# Patient Record
Sex: FEMALE | Race: WHITE | NOT HISPANIC OR LATINO | Employment: PART TIME | URBAN - METROPOLITAN AREA
[De-identification: names, ages, dates, MRNs, and addresses within clinical notes are randomized per-mention and may not be internally consistent; named-entity substitution may affect disease eponyms.]

---

## 2024-01-23 DIAGNOSIS — R51.9 FREQUENT HEADACHES: Primary | ICD-10-CM

## 2024-09-04 NOTE — PROGRESS NOTES
[facility] Visit Note    Initial Visit Date: 9/6/2024  Current Visit Date:  09/06/2024    Chief Complaint:     Chief Complaint   Patient presents with    Migraine       History of Present Illness:      This is 29 y.o. 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 Patient has a variety of different semiologies to her headaches with myriad of associated symptoms.     Age of Onset : childhood     Headache Description: bilateral temporal squeezing, occasionally holocephalic, and frequently stabbing pain behind right eye and entire right face, that is constant. Occasionally associated with monocular or binocular vision loss followed by hemisensory numbness and possible weakness.  Would occasionally feel dizzy and off balance with her headaches. Has history of pulsatile tinnitus with occasional autophony on the right side.      Frequency: daily headache days per month since 2017    Provocation Factors: thunderstorms    Risk Factors  - Family history of headache disorder: Yes mom with headache disorder.   - History of focal CNS lesions: Yes as above.   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 Also states prior head trauma.   - History of underlying mood disorder: Yes severe anxiety disorder. ADHD. Never seen mental health.   - History of sleep disorder: Yes insomnia  with severe grinding. Jaw pops in and out.   - Recreational drug use: Yes marijuana   - Tobacco use: No  - Alcohol use: No  - Weight fluctuation: Not Applicable  - Isotretinoin or Tetracycline use:  No  - Family planning and contraceptive use: Yes on oral OCP.    Medications:     Current Prophylactic  Denied    Current Abortive  Fioricet PRN: ineffective    Prior Prophylactic  Topiramate:  ineffective    Prior Abortive  Rimegepant 75 mg daily PRN: ineffective  Ubrogepant: ineffective     Devices:     - VNS:  - TNS  - TMS:     Procedures:     - Botox:  - PSG block:   - Occipital nerve block:     Labs:     No results found for this or any previous visit.    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 The vessels of the anterior and posterior circulation are otherwise patent. Again seen is a hypoplastic right A1 segment and fetal origin right PCA. Right STEFFANIE A2 branch primarily supplied via the anterior communicating artery.. No aneurysm or vascular malformation. 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 The venous structures are patent.     - [facility] Neck 7/8/2021 at [facility]: as per documentation,   1. No intracranial large vessel occlusion or aneurysm is seen.   2. Aberrant intracranial right ICA again seen with interval stent placement the previous examination.   3. 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   4. No cervical ICA occlusion, dissection, or significant stenosis is seen by NASCET criteria.     - MRI IAC and Temporal Bone +/- Neo 3/15/2021 at [facility]: as per documentation,  Haste imaging is normal.   Semicircular canals are normal.   Mastoid air cells and middle ears are clear.   There is normal enhancement.   Vestibular aqueduct are normal.     - CTA Head 3/15/2021 at [facility]: as per documentation.  1.Negative CTA [place].   2. 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 This was correlated with catheter angiography showing abnormal contour of the carotid artery at this should angulation near the promontory.   3. The jugular bulb is slightly dehiscence between the petrous carotid and jugular bulb. There is a 0.5 mm gap of bone at  this location.   4. No evidence of fistula as seen on catheter angiography.     Review of Systems:     Review of Systems   All other systems reviewed and are negative.      Physical Exams:     Vitals:    09/06/24 1034   BP: 110/64   Pulse: 75   Resp: 12   Temp: 98.1 °F (36.7 °C)       Physical Exam  Vitals and nursing note reviewed.   Constitutional:       Appearance: Normal appearance.   HENT:      Head: Normocephalic and atraumatic.      Comments: TMJ subluxation right > left with tenderness to palpation.      Nose: Nose normal.      Mouth/Throat:      Mouth: Mucous membranes are moist.      Pharynx: Oropharynx is clear.   Eyes:      Conjunctiva/sclera: Conjunctivae normal.   Cardiovascular:      Rate and Rhythm: Normal rate and regular rhythm.      Pulses: Normal pulses.   Pulmonary:      Effort: Pulmonary effort is normal.      Breath sounds: Normal breath sounds.   Abdominal:      General: Abdomen is flat.   Musculoskeletal:         General: Normal range of motion.      Cervical back: Normal range of motion.   Skin:     General: Skin is warm.   Neurological:      Mental Status: She is alert.         Comprehensive Neurological Exam:  Mental Status: Alert Oriented to Self, Date, and Place.  Comprehension wnl. No dysarthria.   CN II - XII: ARTHUR, No APD, Fundus wnl OU, VFFC, No ptosis OU, EOMI without nystagmus LT/Temp symmetric in CN V1-3 distribution, Hearing grossly intact, Face Symmetric, Tongue and Uvula midline, Trapezius symmetric bilateral.   Motor: tone and bulk wnl throughout, no abnormal involuntary or voluntary movements, 5/5 to confrontation, Fine finger movements wnl b/l, No pronator drift.   Sensory: LT, Proprioception, Vibration, PP, Temp symmetric.   Reflexes: 2+ throughout  Cerebellar: FNF wnl b/l, RAHM wnl b/l  Romberg: Negative  Gait: normal.     Assessment:     This is 29 y.o. 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       Problem List Items Addressed This Visit          Neuro    Migraine with aura and with status migrainosus, not intractable - Primary    Relevant Medications    carBAMazepine (CARBATROL) 200 MG CM12    tiZANidine (ZANAFLEX) 2 MG tablet    Other Relevant Orders    Ambulatory referral/consult to Psychiatry    Ambulatory referral/consult to Physical/Occupational Therapy    Comprehensive metabolic panel    CBC auto differential    HCG, Quantitative    Injury of right trigeminal nerve       Psychiatric    Anxiety    Relevant Medications    carBAMazepine (CARBATROL) 200 MG CM12    tiZANidine (ZANAFLEX) 2 MG tablet    Other Relevant Orders    Ambulatory referral/consult to Psychiatry    Ambulatory referral/consult to Physical/Occupational Therapy    Comprehensive metabolic panel    CBC auto differential    HCG, Quantitative       ENT    Bruxism    Relevant Medications    carBAMazepine (CARBATROL) 200 MG CM12    tiZANidine (ZANAFLEX) 2 MG tablet    Other Relevant Orders    Ambulatory referral/consult to Psychiatry    Ambulatory referral/consult to Physical/Occupational Therapy    Comprehensive metabolic panel    CBC auto differential    HCG, Quantitative       Plan:     [] start Carbamazepine 200 mg twice a day   [] start Tizanidine 2 mg three times a day as needed   [] CBC, CMP, BetaHCG in 2 months  [] referral for outpatient PT/OT for bruxism   [] referral to mental health for anxiety    RTC 3 Months     Headache education provided: good sleep hygiene and 7 hours of sleep per night, stress management, medication overuse education provided. Using more 3 OTC per week may worsen headaches, high intensity interval training has shown to reduce headache frequency. Low carb, high protein has shown to reduce headache frequency. Patient is instructed  in keep headache diary.     I have explained the treatment plan, diagnosis, and prognosis to patient. All questions are answered to the best of my knowledge.     Visit today is associated with current or anticipated ongoing medical care related to this patient's single serious condition/complex condition as documented above.     Face to face time 60 minutes, including documentation, chart review, counseling, education, review of test results, relevant medical records, and coordination of care.       Kristen Castro MD   General Neurology  09/06/2024

## 2024-09-06 ENCOUNTER — OFFICE VISIT (OUTPATIENT)
Dept: NEUROLOGY | Facility: CLINIC | Age: 29
End: 2024-09-06
Payer: MEDICAID

## 2024-09-06 VITALS
DIASTOLIC BLOOD PRESSURE: 64 MMHG | RESPIRATION RATE: 12 BRPM | BODY MASS INDEX: 27.13 KG/M2 | SYSTOLIC BLOOD PRESSURE: 110 MMHG | WEIGHT: 162.81 LBS | TEMPERATURE: 98 F | HEIGHT: 65 IN | HEART RATE: 75 BPM

## 2024-09-06 DIAGNOSIS — S04.31XS INJURY OF RIGHT TRIGEMINAL NERVE, SEQUELA: ICD-10-CM

## 2024-09-06 DIAGNOSIS — F45.8 BRUXISM: ICD-10-CM

## 2024-09-06 DIAGNOSIS — R51.9 CHRONIC DAILY HEADACHE: ICD-10-CM

## 2024-09-06 DIAGNOSIS — F41.9 ANXIETY: ICD-10-CM

## 2024-09-06 DIAGNOSIS — G43.101 MIGRAINE WITH AURA AND WITH STATUS MIGRAINOSUS, NOT INTRACTABLE: Primary | ICD-10-CM

## 2024-09-06 DIAGNOSIS — M24.29 INFLAMMATION OF STYLOMANDIBULAR LIGAMENT: ICD-10-CM

## 2024-09-06 PROBLEM — S04.31XA INJURY OF RIGHT TRIGEMINAL NERVE: Status: ACTIVE | Noted: 2024-09-06

## 2024-09-06 PROCEDURE — 99214 OFFICE O/P EST MOD 30 MIN: CPT | Mod: PBBFAC | Performed by: PSYCHIATRY & NEUROLOGY

## 2024-09-06 RX ORDER — ONDANSETRON 4 MG/1
4 TABLET, ORALLY DISINTEGRATING ORAL 3 TIMES DAILY
COMMUNITY
Start: 2024-08-27

## 2024-09-06 RX ORDER — CARBAMAZEPINE 200 MG/1
200 CAPSULE, EXTENDED RELEASE ORAL 2 TIMES DAILY
Qty: 60 CAPSULE | Refills: 3 | Status: SHIPPED | OUTPATIENT
Start: 2024-09-06 | End: 2025-01-04

## 2024-09-06 RX ORDER — TIZANIDINE 2 MG/1
2 TABLET ORAL EVERY 8 HOURS PRN
Qty: 90 TABLET | Refills: 4 | Status: SHIPPED | OUTPATIENT
Start: 2024-09-06 | End: 2025-03-05

## 2024-09-06 RX ORDER — ALPRAZOLAM 0.5 MG/1
0.5 TABLET ORAL 2 TIMES DAILY PRN
COMMUNITY
Start: 2024-04-08

## 2024-09-06 RX ORDER — BUTALBITAL, ACETAMINOPHEN AND CAFFEINE 300; 40; 50 MG/1; MG/1; MG/1
CAPSULE ORAL
COMMUNITY
Start: 2024-01-22 | End: 2024-09-06

## 2024-09-06 RX ORDER — DROSPIRENONE 4 MG/1
4 TABLET, FILM COATED ORAL DAILY
COMMUNITY

## 2024-09-06 RX ORDER — TIMOLOL MALEATE 2.5 MG/ML
1 SOLUTION/ DROPS OPHTHALMIC DAILY
COMMUNITY

## 2024-09-06 RX ORDER — TRAZODONE HYDROCHLORIDE 100 MG/1
100 TABLET ORAL NIGHTLY
COMMUNITY

## 2024-09-06 RX ORDER — ASPIRIN 81 MG/1
81 TABLET ORAL DAILY
COMMUNITY

## 2024-12-05 NOTE — PROGRESS NOTES
[facility] Neurology Follow Up Telemedicine Visit Note    Initial Visit Date: 9/6/2024  Last Visit Date: 9/6/2024  Current Visit Date:  12/09/2024    Chief Complaint:     Chief Complaint   Patient presents with    Migraine     Patient states she is having migraines everyday. The last 2 weeks have been the worst.        History of Present Illness:      This is a real-time audio/video visit that was performed with the originating site at patient's home and the distant site, [facility]. Verbal consent to participate in interactive audio & video visit was obtained.    I discussed with the patient regarding the nature of our telehealth visits, that:    - Our sessions are not being recorded and that personal health information is protected  - Provider would evaluate the patient and recommend diagnostics and treatments based on my assessment  - [facility] will provide follow up care in person if/when the patient needs it.       This is 29 y.o. 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 During last visit, Carbamazepine 200 mg twice a day and Tizanidine 2 mg three times a day as needed was started. CBC, CMP, BetaHCG was ordered to be done in 2 months. Patient was referred to PT and mental health. Headache has improved in intensity and frequency.     Age of Onset : childhood     Headache Description: bilateral temporal squeezing, occasionally holocephalic, and frequently stabbing pain behind right eye and entire right face, that is constant. Occasionally associated with monocular or binocular vision loss followed by hemisensory numbness and possible weakness.  Would occasionally feel dizzy and off balance with  her headaches. Has history of pulsatile tinnitus with occasional autophony on the right side.      Frequency: 14 headache days per month    Provocation Factors: thunderstorms    Risk Factors  - Family history of headache disorder: Yes mom with headache disorder.   - History of focal CNS lesions: Yes as above.   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 Also states prior head trauma.   - History of underlying mood disorder: Yes severe anxiety disorder. ADHD. Never seen mental health. Anxiety better now.   - History of sleep disorder: Yes insomnia  with severe grinding. Jaw pops in and out.   - Recreational drug use: Yes marijuana   - Tobacco use: No  - Alcohol use: No  - Weight fluctuation: Not Applicable  - Isotretinoin or Tetracycline use:  No  - Family planning and contraceptive use: Yes on oral OCP.    Medications:     Current Prophylactic  Carbamazepine 200 mg twice a day (9/6/2024 to present)    Current Abortive  Fioricet PRN: ineffective  Tizanidine 2 mg three times a day as needed (9/6/2024 to present)    Prior Prophylactic  Topiramate: ineffective    Prior Abortive  Rimegepant 75 mg daily PRN: ineffective  Ubrogepant: ineffective     Devices:     - VNS:  - TNS  - TMS:     Procedures:     - Botox:  - PSG block:   - Occipital nerve block:     Labs:     WBC [4.0-10.0 x10^3/mcL] 9.6 x10^3/mcL  (11/4/24 11:03 AM)   RBC [3.80-5.00 x10^6/mcL] 4.28 x10^6/mcL  (11/4/24 11:03 AM)   Neutro Auto [42.0-75.0 %] 71.4 %  (11/4/24 11:03 AM)   Lymph Auto [21.0-51.0 %] 19.8 %  *LOW*  (11/4/24 11:03 AM)   Mono Auto [4.0-12.0 %] 7.0 %  (11/4/24 11:03 AM)   Basophil Auto [0.0-2.0 %] 0.6 %  (11/4/24 11:03 AM)   BUN [7-18 mg/dL] 15 mg/dL  (11/4/24 11:03 AM)   Glucose Level [ mg/dL] 94 mg/dL  (11/4/24 11:03 AM)   Potassium Level [3.5-5.1 mmol/L] 4.6 mmol/L  (11/4/24 11:03 AM)   Baso Absolute [0.0-0.1 x10^3/mcL] 0.1 x10^3/mcL  (11/4/24  11:03 AM)   MCV [83.0-97.0 fL] 91.8 fL  (11/4/24 11:03 AM)   AST [15-37 unit/L] 17 unit/L  (11/4/24 11:03 AM)   ALT [12-78 unit/L] 21 unit/L  (11/4/24 11:03 AM)   MCHC [33.0-36.0 g/dL] 33.6 g/dL  (11/4/24 11:03 AM)   Sodium Level [136-145 mmol/L] 137 mmol/L  (11/4/24 11:03 AM)   Lymph Absolute [0.5-4.1 x10^3/mcL] 1.9 x10^3/mcL  (11/4/24 11:03 AM)   Hct [34.0-44.0 %] 39.3 %  (11/4/24 11:03 AM)   Calcium Level [8.5-10.1 mg/dL] 9.4 mg/dL  (11/4/24 11:03 AM)   Mono Absolute [0.1-1.1 x10^3/mcL] 0.7 x10^3/mcL  (11/4/24 11:03 AM)   Albumin Level [3.4-5.0 g/dL] 4.0 g/dL  (11/4/24 11:03 AM)   Protein Total [6.4-8.2 g/dL] 7.7 g/dL  (11/4/24 11:03 AM)   MCH [28.0-32.0 pg] 30.8 pg  (11/4/24 11:03 AM)   Neutro Absolute [1.5-6.9 x10^3/mcL] 6.9 x10^3/mcL  (11/4/24 11:03 AM)   Bilirubin Total [0.2-1.0 mg/dL] 0.2 mg/dL  (11/4/24 11:03 AM)   Hgb [12.0-14.0 g/dL] 13.2 g/dL  (11/4/24 11:03 AM)   Alk Phos [ unit/L] 87 unit/L  (11/4/24 11:03 AM)   MPV [7.4-10.4 fL] 10.0 fL  (11/4/24 11:03 AM)   Platelets [130-400 x10^3/mcL] 334 x10^3/mcL  (11/4/24 11:03 AM)   CO2 [21-32 mmol/L] 29 mmol/L  (11/4/24 11:03 AM)   Eos Absolute [0.0-0.7 x10^3/mcL] 0.0 x10^3/mcL  (11/4/24 11:03 AM)   RDW [10.2-13.4 %] 11.7 %  (11/4/24 11:03 AM)   eGFR Non-.9 mL/min/1.73 m2  *NA*  (11/4/24 11:03 AM)   eGFR .4 mL/min/1.73 m2  *NA*  (11/4/24 11:03 AM)   Chloride Level [ mmol/L] 100 mmol/L  (11/4/24 11:03 AM)   A/G Ratio [1.1-2.5] 1.1  (11/4/24 11:03 AM)   BUN/Creat Ratio [10.0-20.0 %] 21.7 %  *HI*  (11/4/24 11:03 AM)   Calcium Calculated [8.3-10.6 mg/dL] 9.4 mg/dL  (11/4/24 11:03 AM)   Globulin [2.0-3.5 g/dL] 3.7 g/dL  *HI*  (11/4/24 11:03 AM)   NRBC Auto 0  *NA*  (11/4/24 11:03 AM)   Creatinine Level [0.60-1.30 mg/dL] 0.69 mg/dL  (11/4/24 11:03 AM)   Anion Gap [12.0-20.0 mmol/L] 12.6 mmol/L  (11/4/24 11:03 AM)   Eos, Auto [0.0-5.0 %] 0.1 %  (11/4/24 11:03 AM)   HCG, Beta Quantitative <1.0 mIU/mL 1  *NA*  (11/4/24 11:03 AM)       Studies:      - CT[facility] Head 10/4/2021 at [facility]: as per documentation, 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 The vessels of the anterior and posterior circulation are otherwise patent. Again seen is a hypoplastic right A1 segment and fetal origin right PCA. Right STEFFANIE A2 branch primarily supplied via the anterior communicating artery.. No aneurysm or vascular malformation. 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 The venous structures are patent.     - CTA Neck 7/8/2021 at [facility]: as per documentation,   1. No intracranial large vessel occlusion or aneurysm is seen.   2. Aberrant intracranial right ICA again seen with interval stent placement the previous examination.   3. 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   4. No cervical ICA occlusion, dissection, or significant stenosis is seen by NASCET criteria.     - MRI IAC and Temporal Bone +/- Neo 3/15/2021 at [facility]: as per documentation,  Haste imaging is normal.   Semicircular canals are normal.   Mastoid air cells and middle ears are clear.   There is normal enhancement.   Vestibular aqueduct are normal.     - CTA Head 3/15/2021 at [facility]: as per documentation.  1.Negative CTA [place].   2. 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 This was correlated with catheter angiography showing abnormal contour of the carotid artery at this should angulation near the promontory.   3. The jugular bulb is slightly dehiscence between the petrous carotid and jugular bulb. There is a 0.5 mm gap of bone at this location.   4. No evidence of fistula as seen on catheter angiography.     Review of Systems:     Review of Systems   All other systems reviewed and are negative.      Physical Exams:     Physical Exam  Nursing note reviewed.   Constitutional:       Appearance: Normal  appearance.   HENT:      Head: Atraumatic.   Pulmonary:      Effort: Pulmonary effort is normal.   Musculoskeletal:         General: Normal range of motion.      Cervical back: Normal range of motion.   Neurological:      Mental Status: She is alert.       Telemedicine Comprehensive Neurological Exam:  Mental Status: Alert Oriented to Self, Date, and Place. Comprehension wnl. No dysarthria.   CN II - XII: ARTHUR, VA grossly intact, No ptosis OU, EOMI without nystagmus, Hearing grossly intact, Face Symmetric, Tongue and Uvula midline, Trapezius symmetric bilateral.   Motor: no abnormal involuntary or voluntary movements, Fine finger movements wnl b/l, No pronator drift.   Sensory: unable to assess.  Reflexes: unable to assess.   Cerebellar: RAHM wnl b/l.  Romberg: absent.   Gait: normal.      Assessment:     This is 29 y.o. 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       Problem List Items Addressed This Visit          Neuro    Migraine with aura and with status migrainosus, not intractable    Relevant Medications    carBAMazepine (CARBATROL) 200 MG CM12       Psychiatric    Anxiety    Relevant Medications    carBAMazepine (CARBATROL) 200 MG CM12       ENT    Bruxism    Relevant Medications    carBAMazepine (CARBATROL) 200 MG CM12         Plan:     [] increase Carbamazepine to 400 mg in daily and 200 mg in PM  [] continue with Tizanidine 2 mg three times a day as needed     RTC 3 Months via Telemedicine    Headache education provided: good sleep hygiene and 7 hours of sleep per night, stress management, medication overuse education provided. Using more 3 OTC per week may worsen headaches, high intensity interval training has shown to reduce headache frequency. Low  carb, high protein has shown to reduce headache frequency. Patient is instructed in keep headache diary.     I have explained the treatment plan, diagnosis, and prognosis to patient. All questions are answered to the best of my knowledge.     Visit today is associated with current or anticipated ongoing medical care related to this patient's single serious condition/complex condition as documented above.     Face to face time 30 minutes, including documentation, chart review, counseling, education, review of test results, relevant medical records, and coordination of care.       Kristen Castro MD   General Neurology  12/09/2024

## 2024-12-09 ENCOUNTER — OFFICE VISIT (OUTPATIENT)
Dept: NEUROLOGY | Facility: CLINIC | Age: 29
End: 2024-12-09
Payer: MEDICAID

## 2024-12-09 DIAGNOSIS — F41.9 ANXIETY: ICD-10-CM

## 2024-12-09 DIAGNOSIS — G43.101 MIGRAINE WITH AURA AND WITH STATUS MIGRAINOSUS, NOT INTRACTABLE: ICD-10-CM

## 2024-12-09 DIAGNOSIS — F45.8 BRUXISM: ICD-10-CM

## 2024-12-09 PROCEDURE — 99214 OFFICE O/P EST MOD 30 MIN: CPT | Mod: 95,,, | Performed by: PSYCHIATRY & NEUROLOGY

## 2024-12-09 PROCEDURE — 1159F MED LIST DOCD IN RCRD: CPT | Mod: CPTII,95,, | Performed by: PSYCHIATRY & NEUROLOGY

## 2024-12-09 PROCEDURE — 1160F RVW MEDS BY RX/DR IN RCRD: CPT | Mod: CPTII,95,, | Performed by: PSYCHIATRY & NEUROLOGY

## 2024-12-09 PROCEDURE — G2211 COMPLEX E/M VISIT ADD ON: HCPCS | Mod: 95,,, | Performed by: PSYCHIATRY & NEUROLOGY

## 2024-12-09 RX ORDER — ONDANSETRON HYDROCHLORIDE 8 MG/1
8 TABLET, FILM COATED ORAL
COMMUNITY
End: 2024-12-09

## 2024-12-09 RX ORDER — BUTALBITAL, ACETAMINOPHEN AND CAFFEINE 300; 40; 50 MG/1; MG/1; MG/1
CAPSULE ORAL
COMMUNITY
End: 2024-12-09

## 2024-12-09 RX ORDER — AZELASTINE 1 MG/ML
SPRAY, METERED NASAL
COMMUNITY
Start: 2024-12-02

## 2024-12-09 RX ORDER — METHYLPREDNISOLONE 4 MG/1
4 TABLET ORAL DAILY
COMMUNITY
Start: 2024-12-04

## 2024-12-09 RX ORDER — CARBAMAZEPINE 200 MG/1
CAPSULE, EXTENDED RELEASE ORAL
Qty: 90 CAPSULE | Refills: 3 | Status: SHIPPED | OUTPATIENT
Start: 2024-12-09 | End: 2025-04-08

## 2024-12-27 DIAGNOSIS — F41.9 ANXIETY: ICD-10-CM

## 2024-12-27 DIAGNOSIS — G43.101 MIGRAINE WITH AURA AND WITH STATUS MIGRAINOSUS, NOT INTRACTABLE: ICD-10-CM

## 2024-12-27 DIAGNOSIS — F45.8 BRUXISM: ICD-10-CM

## 2024-12-27 RX ORDER — CARBAMAZEPINE 200 MG/1
200 CAPSULE, EXTENDED RELEASE ORAL 2 TIMES DAILY
Qty: 60 CAPSULE | Refills: 3 | Status: SHIPPED | OUTPATIENT
Start: 2024-12-27

## 2025-01-29 DIAGNOSIS — F41.9 ANXIETY: ICD-10-CM

## 2025-01-29 DIAGNOSIS — G43.101 MIGRAINE WITH AURA AND WITH STATUS MIGRAINOSUS, NOT INTRACTABLE: ICD-10-CM

## 2025-01-29 DIAGNOSIS — F45.8 BRUXISM: ICD-10-CM

## 2025-01-29 RX ORDER — TIZANIDINE 2 MG/1
2 TABLET ORAL EVERY 8 HOURS PRN
Qty: 90 TABLET | Refills: 2 | Status: SHIPPED | OUTPATIENT
Start: 2025-01-29 | End: 2025-07-28

## 2025-04-12 DIAGNOSIS — G43.101 MIGRAINE WITH AURA AND WITH STATUS MIGRAINOSUS, NOT INTRACTABLE: ICD-10-CM

## 2025-04-12 DIAGNOSIS — F41.9 ANXIETY: ICD-10-CM

## 2025-04-12 DIAGNOSIS — F45.8 BRUXISM: ICD-10-CM

## 2025-04-14 RX ORDER — CARBAMAZEPINE 200 MG/1
CAPSULE, EXTENDED RELEASE ORAL
Qty: 270 CAPSULE | Refills: 1 | Status: SHIPPED | OUTPATIENT
Start: 2025-04-14

## 2025-04-24 NOTE — PROGRESS NOTES
[facility] Neurology Follow Up Telemedicine Visit Note    Initial Visit Date: 9/6/2024  Last Visit Date: 12/9/2024  Current Visit Date:  04/28/2025    Chief Complaint:     Chief Complaint   Patient presents with    Migraine with aura and with status migrainosus, not intract     Patient reports 1 a week.        History of Present Illness:      This is a real-time audio/video visit that was performed with the originating site at patient's home and the distant site, [facility]. Verbal consent to participate in interactive audio & video visit was obtained.    I discussed with the patient regarding the nature of our telehealth visits, that:    - Our sessions are not being recorded and that personal health information is protected  - Provider would evaluate the patient and recommend diagnostics and treatments based on my assessment  - [facility] will provide follow up care in person if/when the patient needs it.       This is 29 y.o. 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 During last visit, Carbamazepine was increased to 400 mg - 200 mg.     Age of Onset : childhood     Headache Description: bilateral temporal squeezing, occasionally holocephalic, and frequently stabbing pain behind right eye and entire right face, that is constant. Occasionally associated with monocular or binocular vision loss followed by hemisensory numbness and possible weakness.  Would occasionally feel dizzy and off balance with her headaches. Has history of pulsatile tinnitus with occasional autophony on the right side.      Frequency: 4 headache days per month    Provocation Factors: thunderstorms    Risk Factors  - Family  history of headache disorder: Yes mom with headache disorder.   - History of focal CNS lesions: Yes as above.   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 Also states prior head trauma.   - History of underlying mood disorder: Yes severe anxiety disorder. ADHD. Never seen mental health. Anxiety better now.   - History of sleep disorder: Yes insomnia  with severe grinding. Jaw pops in and out.   - Recreational drug use: Yes marijuana   - Tobacco use: No  - Alcohol use: No  - Weight fluctuation: Not Applicable  - Isotretinoin or Tetracycline use:  No  - Family planning and contraceptive use: Yes on oral OCP.    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 The vessels of the anterior and posterior circulation are otherwise patent. Again seen is a hypoplastic right A1 segment and fetal origin right PCA. Right STEFFANIE A2 branch primarily supplied via the anterior communicating artery.. No aneurysm or vascular malformation. 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 The venous structures are patent.     - CTA Neck 7/8/2021 at [facility]: as per documentation,   1. No intracranial large vessel occlusion or aneurysm is seen.   2. Aberrant intracranial right ICA again seen with interval stent placement the previous  examination.   3. 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   4. No cervical ICA occlusion, dissection, or significant stenosis is seen by NASCET criteria.     - MRI IAC and Temporal Bone +/- Neo 3/15/2021 at [facility]: as per documentation,  Haste imaging is normal.   Semicircular canals are normal.   Mastoid air cells and middle ears are clear.   There is normal enhancement.   Vestibular aqueduct are normal.     - CTA Head 3/15/2021 at [facility]: as per documentation.  1.Negative CTA [place].   2. 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 This was correlated with catheter angiography showing abnormal contour of the carotid artery at this should angulation near the promontory.   3. The jugular bulb is slightly dehiscence between the petrous carotid and jugular bulb. There is a 0.5 mm gap of bone at this location.   4. No evidence of fistula as seen on catheter angiography.     Review of Systems:     Review of Systems   All other systems reviewed and are negative.      Physical Exams:     Physical Exam  Nursing note reviewed.   Constitutional:       Appearance: Normal appearance.   HENT:      Head: Atraumatic.   Pulmonary:      Effort: Pulmonary effort is normal.   Musculoskeletal:         General: Normal range of motion.      Cervical back: Normal range of motion.   Neurological:      Mental Status: She is alert.       Telemedicine Comprehensive Neurological Exam:  Mental Status: Alert Oriented to Self, Date, and Place. Comprehension wnl. No dysarthria.   CN II - XII: ARTHUR, VA grossly intact, No ptosis OU, EOMI without nystagmus, Hearing grossly intact, Face Symmetric, Tongue and Uvula midline, Trapezius symmetric bilateral.   Motor: no abnormal involuntary or voluntary movements, Fine finger movements wnl b/l, No pronator drift.   Sensory: unable to assess.  Reflexes: unable to assess.   Cerebellar: RAHM wnl  b/l.  Romberg: absent.   Gait: normal.      Assessment:     This is 29 y.o. 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       Problem List Items Addressed This Visit          Neuro    Migraine with aura and with status migrainosus, not intractable    Relevant Medications    carBAMazepine (CARBATROL) 200 MG CM12       Psychiatric    Anxiety       ENT    Bruxism           Plan:     [] increase Carbamazepine to 400 mg twice a day  [] continue with Tizanidine 2 mg three times a day as needed     RTC 3 Months via Telemedicine    Headache education provided: good sleep hygiene and 7 hours of sleep per night, stress management, medication overuse education provided. Using more 3 OTC per week may worsen headaches, high intensity interval training has shown to reduce headache frequency. Low carb, high protein has shown to reduce headache frequency. Patient is instructed in keep headache diary.     I have explained the treatment plan, diagnosis, and prognosis to patient. All questions are answered to the best of my knowledge.     Visit today is associated with current or anticipated ongoing medical care related to this patient's single serious condition/complex condition as documented above.     Face to face time 30 minutes, including documentation, chart review, counseling, education, review of test results, relevant medical records, and coordination of care.       Kristen Castro MD   General Neurology  04/28/2025

## 2025-04-28 ENCOUNTER — OFFICE VISIT (OUTPATIENT)
Dept: NEUROLOGY | Facility: CLINIC | Age: 30
End: 2025-04-28
Payer: MEDICAID

## 2025-04-28 DIAGNOSIS — F41.9 ANXIETY: ICD-10-CM

## 2025-04-28 DIAGNOSIS — G43.101 MIGRAINE WITH AURA AND WITH STATUS MIGRAINOSUS, NOT INTRACTABLE: ICD-10-CM

## 2025-04-28 DIAGNOSIS — F45.8 BRUXISM: ICD-10-CM

## 2025-04-28 RX ORDER — CARBAMAZEPINE 200 MG/1
400 CAPSULE, EXTENDED RELEASE ORAL 2 TIMES DAILY
Qty: 360 CAPSULE | Refills: 1 | Status: SHIPPED | OUTPATIENT
Start: 2025-04-28 | End: 2025-10-25

## 2025-04-28 RX ORDER — TRAZODONE HYDROCHLORIDE 150 MG/1
TABLET ORAL
COMMUNITY

## 2025-07-24 NOTE — PROGRESS NOTES
"[facility] Neurology Follow Up Telemedicine Visit Note    Initial Visit Date: 9/6/2024  Last Visit Date: 4/28/2025  Current Visit Date:  07/28/2025    Chief Complaint:     Chief Complaint   Patient presents with    Migraine      Pt reports a "massive" migraine the entire week before her last cycle which started on 07/20/25, migraine had began 07/14/25       History of Present Illness:      This is a real-time audio/video visit that was performed with the originating site at patient's home and the distant site, [facility]. Verbal consent to participate in interactive audio & video visit was obtained.    I discussed with the patient regarding the nature of our telehealth visits, that:    - Our sessions are not being recorded and that personal health information is protected  - Provider would evaluate the patient and recommend diagnostics and treatments based on my assessment  - [facility] will provide follow up care in person if/when the patient needs it.       This is 30 y.o. 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 During last visit, carbamazepine was increased to 400 mg twice a day.     Age of Onset : childhood     Headache Description: bilateral temporal squeezing, occasionally holocephalic, and frequently stabbing pain behind right eye and entire right face, that is constant. Occasionally associated with monocular or binocular vision loss followed by hemisensory numbness and possible weakness.  Would occasionally feel dizzy and off balance with her headaches. Has history of pulsatile tinnitus with occasional autophony on the right side.      Frequency: 4 -  6 headache days per month    Provocation Factors: thunderstorms, " menstruation.     Risk Factors  - Family history of headache disorder: Yes mom with headache disorder.   - History of focal CNS lesions: Yes as above.   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 Also states prior head trauma.   - History of underlying mood disorder: Yes severe anxiety disorder. ADHD. Never seen mental health. Anxiety better now.   - History of sleep disorder: Yes insomnia with severe grinding. Jaw pops in and out.   - Recreational drug use: Yes marijuana   - Tobacco use: No  - Alcohol use: No  - Weight fluctuation: Not Applicable  - Isotretinoin or Tetracycline use:  No  - Family planning and contraceptive use: Yes on oral OCP.    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 The vessels of the anterior and posterior circulation are otherwise patent. Again seen is a hypoplastic right A1 segment and fetal origin right PCA. Right STEFFANIE A2 branch primarily supplied via the anterior communicating artery.. No aneurysm or vascular malformation. 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 The venous structures are patent.     - CTA Neck 7/8/2021 at [facility]: as per documentation,   1. No intracranial large vessel occlusion or aneurysm is seen.   2. Aberrant intracranial right ICA again seen with  interval stent placement the previous examination.   3. 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   4. No cervical ICA occlusion, dissection, or significant stenosis is seen by NASCET criteria.     - MRI IAC and Temporal Bone +/- Neo 3/15/2021 at [facility]: as per documentation,  Haste imaging is normal.   Semicircular canals are normal.   Mastoid air cells and middle ears are clear.   There is normal enhancement.   Vestibular aqueduct are normal.     - CTA Head 3/15/2021 at [facility]: as per documentation.  1.Negative CTA [place].   2. 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 This was correlated with catheter angiography showing abnormal contour of the carotid artery at this should angulation near the promontory.   3. The jugular bulb is slightly dehiscence between the petrous carotid and jugular bulb. There is a 0.5 mm gap of bone at this location.   4. No evidence of fistula as seen on catheter angiography.     Review of Systems:     Review of Systems   All other systems reviewed and are negative.      Physical Exams:     Physical Exam  Nursing note reviewed.   Constitutional:       Appearance: Normal appearance.   HENT:      Head: Atraumatic.   Pulmonary:      Effort: Pulmonary effort is normal.   Musculoskeletal:         General: Normal range of motion.      Cervical back: Normal range of motion.   Neurological:      Mental Status: She is alert.       Telemedicine Comprehensive Neurological Exam:  Mental Status: Alert Oriented to Self, Date, and Place. Comprehension wnl. No dysarthria.   CN II - XII: ARTHUR, VA grossly intact, No ptosis OU, EOMI without nystagmus, Hearing grossly intact, Face Symmetric, Tongue and Uvula midline, Trapezius symmetric bilateral.   Motor: no abnormal involuntary or voluntary movements, Fine finger movements wnl b/l, No pronator drift.   Sensory: unable to assess.  Reflexes: unable to  assess.   Cerebellar: RAHM wnl b/l.  Romberg: absent.   Gait: normal.      Assessment:     This is 30 y.o. 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 Patient would like to try Botox for oromandibular dystonia.     1. Chronic daily headache    2. Injury of right trigeminal nerve, sequela  -     carBAMazepine (CARBATROL) 200 MG CM12; Take 2 capsules (400 mg total) by mouth 2 (two) times daily.  Dispense: 360 capsule; Refill: 1    3. Intractable migraine with aura with status migrainosus  -     lasmiditan 100 mg Tab; Take 100 mg by mouth daily as needed (for severe migraine headaches. max of 100 mg in 24 hours.).  Dispense: 8 tablet; Refill: 6    4. Inflammation of stylomandibular ligament    5. Isolated oromandibular dystonia  -     onabotulinumtoxina injection 100 Units    6. Migraine with aura and with status migrainosus, not intractable  -     tiZANidine (ZANAFLEX) 2 MG tablet; Take 1 tablet (2 mg total) by mouth every 8 (eight) hours as needed (headache).  Dispense: 90 tablet; Refill: 2    7. Bruxism    8. Anxiety        Plan:     [] continue with Carbamazepine 400 mg twice a day  [] continue with Tizanidine 2 mg three times a day as needed   [] start Lasmiditan 100 mg daily as needed for severe migraine headaches. Patient had failed Nurtec and Ubrelvy. Triptans are contraindicated in patient with arterial dissections and hypertension.   [] Oromandibular Dystonia Botox Protocol:  A. Temporalis Botox dosage: 40 Units divided in 8 sites Right: 20 Left: 20   B. Mandibular Botox dosage: 10 Units divided in 2 sites Right: 5 Left: 5     Total Units Injected: 50 units  Total Units discarded: 50 units       RTC Botox     Headache education provided: good sleep hygiene and 7 hours of sleep per night, stress  management, medication overuse education provided. Using more 3 OTC per week may worsen headaches, high intensity interval training has shown to reduce headache frequency. Low carb, high protein has shown to reduce headache frequency. Patient is instructed in keep headache diary.     I have explained the treatment plan, diagnosis, and prognosis to patient. All questions are answered to the best of my knowledge.     Visit today is associated with current or anticipated ongoing medical care related to this patient's single serious condition/complex condition as documented above.     Face to face time 40 minutes, including documentation, chart review, counseling, education, review of test results, relevant medical records, and coordination of care.       Kristen Castro MD   General Neurology  07/28/2025

## 2025-07-28 ENCOUNTER — OFFICE VISIT (OUTPATIENT)
Dept: NEUROLOGY | Facility: CLINIC | Age: 30
End: 2025-07-28
Payer: MEDICAID

## 2025-07-28 DIAGNOSIS — F41.9 ANXIETY: ICD-10-CM

## 2025-07-28 DIAGNOSIS — S04.31XS INJURY OF RIGHT TRIGEMINAL NERVE, SEQUELA: ICD-10-CM

## 2025-07-28 DIAGNOSIS — R51.9 CHRONIC DAILY HEADACHE: Primary | ICD-10-CM

## 2025-07-28 DIAGNOSIS — G43.111 INTRACTABLE MIGRAINE WITH AURA WITH STATUS MIGRAINOSUS: ICD-10-CM

## 2025-07-28 DIAGNOSIS — G43.101 MIGRAINE WITH AURA AND WITH STATUS MIGRAINOSUS, NOT INTRACTABLE: ICD-10-CM

## 2025-07-28 DIAGNOSIS — F45.8 BRUXISM: ICD-10-CM

## 2025-07-28 DIAGNOSIS — M24.29 INFLAMMATION OF STYLOMANDIBULAR LIGAMENT: ICD-10-CM

## 2025-07-28 DIAGNOSIS — G24.4 ISOLATED OROMANDIBULAR DYSTONIA: ICD-10-CM

## 2025-07-28 RX ORDER — TIZANIDINE 2 MG/1
2 TABLET ORAL EVERY 8 HOURS PRN
Qty: 90 TABLET | Refills: 2 | Status: SHIPPED | OUTPATIENT
Start: 2025-07-28 | End: 2026-01-24

## 2025-07-28 RX ORDER — CARBAMAZEPINE 200 MG/1
400 CAPSULE, EXTENDED RELEASE ORAL 2 TIMES DAILY
Qty: 360 CAPSULE | Refills: 1 | Status: SHIPPED | OUTPATIENT
Start: 2025-07-28 | End: 2026-01-24